# Patient Record
Sex: MALE | Race: BLACK OR AFRICAN AMERICAN | URBAN - METROPOLITAN AREA
[De-identification: names, ages, dates, MRNs, and addresses within clinical notes are randomized per-mention and may not be internally consistent; named-entity substitution may affect disease eponyms.]

---

## 2017-01-08 DIAGNOSIS — Z20.2 EXPOSURE TO CHLAMYDIA: ICD-10-CM

## 2017-01-24 ENCOUNTER — OFFICE VISIT (OUTPATIENT)
Dept: INTERNAL MEDICINE CLINIC | Age: 37
End: 2017-01-24

## 2017-01-24 VITALS
HEIGHT: 72 IN | DIASTOLIC BLOOD PRESSURE: 74 MMHG | SYSTOLIC BLOOD PRESSURE: 115 MMHG | OXYGEN SATURATION: 100 % | WEIGHT: 188.2 LBS | BODY MASS INDEX: 25.49 KG/M2 | TEMPERATURE: 98.2 F | RESPIRATION RATE: 16 BRPM | HEART RATE: 62 BPM

## 2017-01-24 DIAGNOSIS — A74.9 CHLAMYDIA: Primary | ICD-10-CM

## 2017-01-24 DIAGNOSIS — A74.9 CHLAMYDIA: ICD-10-CM

## 2017-01-24 NOTE — PROGRESS NOTES
Chief Complaint   Patient presents with    Chronic Kidney Disease     Stage 3 follow up   [facility]     done [facility] to discuss    Back Pain     is resolved there is no more back pain since last office visit      Patient was given another copy of the Advanced Directive form and understands to bring it in once completed. 1. Have you been to the ER, urgent care clinic since your last visit? Hospitalized since your last visit? No    2. Have you seen or consulted any other health care providers outside of the [address] [place] [address] since your last visit? Include any pap smears or colon screening.  No

## 2017-01-24 NOTE — MR AVS SNAPSHOT
Visit Information Date & Time Provider Department Dept. Phone Encounter #  
 1/24/2017  9:30 AM Soledad Matthews MD Internist of [facility] [address] [id number] Follow-up Instructions Return in about 1 year (around 1/24/2018), or if symptoms worsen or fail to improve. Upcoming Health Maintenance Date Due DTaP/Tdap/Td series (1 - Tdap) 3/2/1999 INFLUENZA AGE 9 TO ADULT 8/1/2016 Allergies as of 1/24/2017  Review Complete On: 1/24/2017 By: Soledad Matthews MD  
 No Known Allergies Current Immunizations  Never Reviewed No immunizations on file. Not reviewed this visit You Were Diagnosed With   
  
 Codes Comments Chlamydia    -  Primary ICD-10-CM: A74.9 ICD-9-CM: 079.98 Vitals BP Pulse Temp Resp Height(growth percentile) Weight(growth percentile) 115/74 (BP 1 Location: Left arm, BP Patient Position: Sitting) 62 98.2 °F (36.8 °C) (Oral) 16 6' (1.829 m) 188 lb 3.2 oz (85.4 kg) SpO2 BMI Smoking Status 100% 25.52 kg/m2 Former Smoker Vitals History BMI and BSA Data Body Mass Index Body Surface Area 25.52 kg/m 2 2.08 m 2 Preferred Pharmacy Pharmacy Name Phone [facility] [id number] [place], [address] AT [address] [phone] Your Updated Medication List  
  
   
This list is accurate as of: 1/24/17  9:58 AM.  Always use your most recent med list.  
  
  
  
  
 TYLENOL 325 mg tablet Generic drug:  acetaminophen Take 650 mg by mouth every four (4) hours as needed for Pain. Follow-up Instructions Return in about 1 year (around 1/24/2018), or if symptoms worsen or fail to improve. To-Do List   
 Around 01/24/2017 Lab:  CT/GC RRNA PLUS (TMA) URINE Patient Instructions Patient was given another copy of the Advanced Directive form and understands to bring it in once completed.  
Health Maintenance Due  
 Topic Date Due  
 DTaP/Tdap/Td series (1 - Tdap) 03/02/1999  
 INFLUENZA AGE 9 TO ADULT  08/01/2016 PLAN: 
Key points we discussed today: 1. Call our office if symptoms worsen or if you have any questions 2. Urine chlamydia testing ordered to ensure resolution of infection. Dr. Verena Uribe [facility] of [address] [place] [address], [address] [address] Phone: [phone] Fax: [phone] Thank you for choosing [facility] [employer] [facility] for all of your health care needs. Gonorrhea and Chlamydia: About These Tests What is it? You can have a test to find out if you have gonorrhea or chlamydia. This kind of test checks for the bacteria that cause these sexually transmitted infections (STIs). There are different ways to test for the bacteria. Most tests use either a urine sample or a sample of body fluid. A fluid sample can come from inside the tip of the penis or from inside the rectum or vagina. Why is this test done? These tests may be done to: · Find out if your symptoms are caused by gonorrhea or chlamydia. · Find out if you have one of these infections even though you don't have symptoms. How can you prepare for the test? 
· If you are a woman, do not douche or use vaginal creams or medicines for at least 24 hours before the test. 
· If you are going to have a urine test, do not urinate for 2 hours before the test. 
What happens during the test? 
· To get a sample from the urethra or rectum, the doctor will put a small swab into the tip of the penis or inside the rectum. · To get a sample from the vagina, the doctor will first put a speculum into the vagina. A speculum is a tool to spread apart the walls of the vagina. Then he or she will use a small swab to get the fluid sample. · For a urine sample, you will collect the urine that comes out when you first start to urinate. Don't wipe the genital area clean before you urinate. What else should you know about the test? 
· If you think you may have chlamydia or gonorrhea, don't have sexual intercourse until you get your test results. And you may want to have tests for other STIs, such as HIV. · If you do have an infection, don't have sexual intercourse for 7 days after you start treatment. · If you have an infection, your sex partner(s) should also be treated. How long does the test take? · The test will take a few minutes. What happens after the test? 
· You will be able to go home right away. · You can go back to your usual activities right away. Follow-up care is a key part of your treatment and safety. Be sure to make and go to all appointments, and call your doctor if you are having problems. It's also a good idea to keep a list of the medicines you take. Ask your doctor when you can expect to have your test results. Where can you learn more? Go to http://yovany-fabian.info/. Enter G948 in the search box to learn more about \"Gonorrhea and Chlamydia: About These Tests. \" Current as of: May 27, 2016 Content Version: 11.1 © 8823-7735 [employer]. Care instructions adapted under license by [employer] (which disclaims liability or warranty for this information). If you have questions about a medical condition or this instruction, always ask your healthcare professional. [address] any warranty or liability for your use of this information. Introducing [facility]! Chris Gavin introduces MVP Interactive patient portal. Now you can access parts of your medical record, email your doctor's office, and request medication refills online. 1. In your internet browser, go to [email or web address] acquired by [email or web address] 2. Click on the First Time User? Click Here link in the Sign In box. You will see the New Member Sign Up page. 3. Enter your MVP Interactive Access Code exactly as it appears below.  You will not need to use this code after youve completed the sign-up process. If you do not sign up before the expiration date, you must request a new code. · SparkupReader Access Code: [email or web address] Expires: 3/9/2017  9:23 AM 
 
4. Enter the last four digits of your Social Security Number (xxxx) and Date of Birth (mm/dd/yyyy) as indicated and click Submit. You will be taken to the next sign-up page. 5. Create a SparkupReader ID. This will be your SparkupReader login ID and cannot be changed, so think of one that is secure and easy to remember. 6. Create a SparkupReader password. You can change your password at any time. 7. Enter your Password Reset Question and Answer. This can be used at a later time if you forget your password. 8. Enter your e-mail address. You will receive e-mail notification when new information is available in [address] 9. Click Sign Up. You can now view and download portions of your medical record. 10. Click the Download Summary menu link to download a portable copy of your medical information. If you have questions, please visit the Frequently Asked Questions section of the SparkupReader website. Remember, SparkupReader is NOT to be used for urgent needs. For medical emergencies, dial 911. Now available from your iPhone and Android! Please provide this summary of care documentation to your next provider. Your primary care clinician is listed as Yamile Pitts. If you have any questions after today's visit, please call [phone].

## 2017-01-24 NOTE — PATIENT INSTRUCTIONS
Patient was given another copy of the Advanced Directive form and understands to bring it in once completed. Health Maintenance Due   Topic Date Due    DTaP/Tdap/Td series (1 - Tdap) 03/02/1999    INFLUENZA AGE 9 TO ADULT  08/01/2016       PLAN:  Key points we discussed today:  1. Call our office if symptoms worsen or if you have any questions    2. Urine chlamydia testing ordered to ensure resolution of infection. Dr. Rossy Loya  [facility]  Phone: [phone]  Fax: [phone]    Thank you for choosing [facility] for all of your health care needs. Gonorrhea and Chlamydia: About These Tests  What is it? You can have a test to find out if you have gonorrhea or chlamydia. This kind of test checks for the bacteria that cause these sexually transmitted infections (STIs). There are different ways to test for the bacteria. Most tests use either a urine sample or a sample of body fluid. A fluid sample can come from inside the tip of the penis or from inside the rectum or vagina. Why is this test done? These tests may be done to:  · Find out if your symptoms are caused by gonorrhea or chlamydia. · Find out if you have one of these infections even though you don't have symptoms. How can you prepare for the test?  · If you are a woman, do not douche or use vaginal creams or medicines for at least 24 hours before the test.  · If you are going to have a urine test, do not urinate for 2 hours before the test.  What happens during the test?  · To get a sample from the urethra or rectum, the doctor will put a small swab into the tip of the penis or inside the rectum. · To get a sample from the vagina, the doctor will first put a speculum into the vagina. A speculum is a tool to spread apart the walls of the vagina. Then he or she will use a small swab to get the fluid sample.   · For a urine sample, you will collect the urine that comes out when you first start to urinate. Don't wipe the genital area clean before you urinate. What else should you know about the test?  · If you think you may have chlamydia or gonorrhea, don't have sexual intercourse until you get your test results. And you may want to have tests for other STIs, such as HIV. · If you do have an infection, don't have sexual intercourse for 7 days after you start treatment. · If you have an infection, your sex partner(s) should also be treated. How long does the test take? · The test will take a few minutes. What happens after the test?  · You will be able to go home right away. · You can go back to your usual activities right away. Follow-up care is a key part of your treatment and safety. Be sure to make and go to all appointments, and call your doctor if you are having problems. It's also a good idea to keep a list of the medicines you take. Ask your doctor when you can expect to have your test results. Where can you learn more? Go to http://yovany-fabian.info/. Enter D681 in the search box to learn more about \"Gonorrhea and Chlamydia: About These Tests. \"  Current as of: May 27, 2016  Content Version: 11.1  © 9456-4746 "[employer].", Incorporated. Care instructions adapted under license by [employer] (which disclaims liability or warranty for this information). If you have questions about a medical condition or this instruction, always ask your healthcare professional. [place] [address] any warranty or liability for your use of this information.
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
## 2017-03-29 ENCOUNTER — HOSPITAL ENCOUNTER (OUTPATIENT)
Dept: LAB | Age: 37
Discharge: HOME | End: 2017-03-29
Payer: COMMERCIAL

## 2017-03-29 ENCOUNTER — OFFICE VISIT (OUTPATIENT)
Dept: INTERNAL MEDICINE CLINIC | Age: 37
End: 2017-03-29

## 2017-03-29 ENCOUNTER — TELEPHONE (OUTPATIENT)
Dept: INTERNAL MEDICINE CLINIC | Age: 37
End: 2017-03-29

## 2017-03-29 VITALS
BODY MASS INDEX: 25.22 KG/M2 | TEMPERATURE: 99.2 F | HEIGHT: 72 IN | SYSTOLIC BLOOD PRESSURE: 132 MMHG | DIASTOLIC BLOOD PRESSURE: 80 MMHG | WEIGHT: 186.2 LBS | OXYGEN SATURATION: 98 % | RESPIRATION RATE: 18 BRPM | HEART RATE: 77 BPM

## 2017-03-29 DIAGNOSIS — Z86.19 H/O CHLAMYDIA INFECTION: ICD-10-CM

## 2017-03-29 DIAGNOSIS — Z20.2 EXPOSURE TO STD: ICD-10-CM

## 2017-03-29 DIAGNOSIS — R39.15 URGENCY OF URINATION: Primary | ICD-10-CM

## 2017-03-29 DIAGNOSIS — R39.15 URGENCY OF URINATION: ICD-10-CM

## 2017-03-29 DIAGNOSIS — N18.30 CKD (CHRONIC KIDNEY DISEASE) STAGE 3, GFR 30-59 ML/MIN (HCC): ICD-10-CM

## 2017-03-29 DIAGNOSIS — R30.0 DYSURIA: ICD-10-CM

## 2017-03-29 LAB
ALBUMIN SERPL BCP-MCNC: 4.2 G/DL (ref 3.4–5)
ALBUMIN/GLOB SERPL: 1.2 {RATIO} (ref 0.8–1.7)
ALP SERPL-CCNC: 39 U/L (ref 45–117)
ALT SERPL-CCNC: 31 U/L (ref 16–61)
ANION GAP BLD CALC-SCNC: 6 MMOL/L (ref 3–18)
APPEARANCE UR: CLEAR
AST SERPL W P-5'-P-CCNC: 24 U/L (ref 15–37)
BILIRUB SERPL-MCNC: 0.4 MG/DL (ref 0.2–1)
BILIRUB UR QL STRIP: NEGATIVE
BILIRUB UR QL: NEGATIVE
BUN SERPL-MCNC: 15 MG/DL (ref 7–18)
BUN/CREAT SERPL: 14 (ref 12–20)
CALCIUM SERPL-MCNC: 9.5 MG/DL (ref 8.5–10.1)
CHLORIDE SERPL-SCNC: 102 MMOL/L (ref 100–108)
CO2 SERPL-SCNC: 31 MMOL/L (ref 21–32)
COLOR UR: YELLOW
CREAT SERPL-MCNC: 1.05 MG/DL (ref 0.6–1.3)
GLOBULIN SER CALC-MCNC: 3.4 G/DL (ref 2–4)
GLUCOSE SERPL-MCNC: 73 MG/DL (ref 74–99)
GLUCOSE UR STRIP.AUTO-MCNC: NEGATIVE MG/DL
GLUCOSE UR-MCNC: NEGATIVE MG/DL
HGB UR QL STRIP: NEGATIVE
KETONES P FAST UR STRIP-MCNC: NEGATIVE MG/DL
KETONES UR QL STRIP.AUTO: NEGATIVE MG/DL
LEUKOCYTE ESTERASE UR QL STRIP.AUTO: NEGATIVE
NITRITE UR QL STRIP.AUTO: NEGATIVE
PH UR STRIP: 6 [PH] (ref 4.6–8)
PH UR STRIP: 6 [PH] (ref 5–8)
POTASSIUM SERPL-SCNC: 3.9 MMOL/L (ref 3.5–5.5)
PROT SERPL-MCNC: 7.6 G/DL (ref 6.4–8.2)
PROT UR QL STRIP: NEGATIVE MG/DL
PROT UR STRIP-MCNC: NEGATIVE MG/DL
RPR SER QL: NONREACTIVE
SODIUM SERPL-SCNC: 139 MMOL/L (ref 136–145)
SP GR UR REFRACTOMETRY: 1.03 (ref 1–1.03)
SP GR UR STRIP: 1.02 (ref 1–1.03)
UA UROBILINOGEN AMB POC: NORMAL (ref 0.2–1)
URINALYSIS CLARITY POC: CLEAR
URINALYSIS COLOR POC: YELLOW
URINE BLOOD POC: NEGATIVE
URINE LEUKOCYTES POC: NEGATIVE
URINE NITRITES POC: NEGATIVE
UROBILINOGEN UR QL STRIP.AUTO: 0.2 EU/DL (ref 0.2–1)

## 2017-03-29 PROCEDURE — 87491 CHLMYD TRACH DNA AMP PROBE: CPT | Performed by: HOSPITALIST

## 2017-03-29 PROCEDURE — 80053 COMPREHEN METABOLIC PANEL: CPT | Performed by: HOSPITALIST

## 2017-03-29 PROCEDURE — 81003 URINALYSIS AUTO W/O SCOPE: CPT | Performed by: HOSPITALIST

## 2017-03-29 PROCEDURE — 86592 SYPHILIS TEST NON-TREP QUAL: CPT | Performed by: HOSPITALIST

## 2017-03-29 PROCEDURE — 87389 HIV-1 AG W/HIV-1&-2 AB AG IA: CPT | Performed by: HOSPITALIST

## 2017-03-29 PROCEDURE — 36415 COLL VENOUS BLD VENIPUNCTURE: CPT | Performed by: HOSPITALIST

## 2017-03-29 PROCEDURE — 86694 HERPES SIMPLEX NES ANTBDY: CPT | Performed by: HOSPITALIST

## 2017-03-29 RX ORDER — AZITHROMYCIN 1 G/1
1 POWDER, FOR SUSPENSION ORAL
Qty: 1 PACKET | Refills: 0 | Status: SHIPPED | OUTPATIENT
Start: 2017-03-29 | End: 2017-03-29

## 2017-03-29 NOTE — TELEPHONE ENCOUNTER
Per Danielle Base with [address] they do not have the following in the way it was prescribed:azithromycin (ZITHROMAX) 1 gram powder       They do have it 250 or 500 mg tab

## 2017-03-29 NOTE — MR AVS SNAPSHOT
Visit Information Date & Time Provider Department Dept. Phone Encounter #  
 3/29/2017 10:15  Skyler Str[address] DO Internists at [facility] [phone] Your Appointments 3/29/2017 11:00 AM  
Office Visit with Doug Beck MD  
Internist of [facility]) Appt Note: pt wants to consult [address] [facility], [address] [place] [address] [place]  
  
   
 [address], [address] Upcoming Health Maintenance Date Due DTaP/Tdap/Td series (1 - Tdap) 3/2/1999 INFLUENZA AGE 9 TO ADULT 8/1/2016 Allergies as of 3/29/2017  Review Complete On: 3/29/2017 By: Blue Arias LPN No Known Allergies Current Immunizations  Never Reviewed No immunizations on file. Not reviewed this visit You Were Diagnosed With   
  
 Codes Comments Urgency of urination    -  Primary ICD-10-CM: R39.15 ICD-9-CM: 831.52 Dysuria     ICD-10-CM: R30.0 ICD-9-CM: 788.1 Exposure to STD     ICD-10-CM: Z20.2 ICD-9-CM: V01.6 CKD (chronic kidney disease) stage 3, GFR 30-59 ml/min     ICD-10-CM: N18.3 ICD-9-CM: 585.3 H/O chlamydia infection     ICD-10-CM: Z86.19 ICD-9-CM: V12.09 Vitals BP Pulse Temp Resp Height(growth percentile) Weight(growth percentile) 132/80 (BP 1 Location: Left arm, BP Patient Position: Sitting) 77 99.2 °F (37.3 °C) (Oral) 18 6' (1.829 m) 186 lb 3.2 oz (84.5 kg) SpO2 BMI Smoking Status 98% 25.25 kg/m2 Former Smoker BMI and BSA Data Body Mass Index Body Surface Area  
 25.25 kg/m 2 2.07 m 2 Preferred Pharmacy Pharmacy Name Phone [phone] - [address], [address] AT [address] [phone] Your Updated Medication List  
  
   
This list is accurate as of: 3/29/17 10:26 AM.  Always use your most recent med list.  
  
  
  
  
 azithromycin 1 gram powder Commonly known as:  [place] Ping Take 1 Packet by mouth now for 1 dose. cefixime 400 mg tablet Commonly known as:  SUPRAX Take 1 Tab by mouth daily. TYLENOL 325 mg tablet Generic drug:  acetaminophen Take 650 mg by mouth every four (4) hours as needed for Pain. Prescriptions Sent to Pharmacy Refills  
 cefixime (SUPRAX) 400 mg tablet 0 Sig: Take 1 Tab by mouth daily. Class: Normal  
 Pharmacy: [facility] [address] [place] [address] AT [address] [place] [address] Rd [address] Ph #: [phone] Route: Oral  
 azithromycin (ZITHROMAX) 1 gram powder 0 Sig: Take 1 Packet by mouth now for 1 dose. Class: Normal  
 Pharmacy: [facility] [address] [place] [address] AT [address] [place] [address] [address] Ph #: [phone] Route: Oral  
  
We Performed the Following AMB POC URINALYSIS DIP STICK AUTO W/O MICRO [92758 CPT(R)] To-Do List   
 03/29/2017 Lab:  CHLAMYDIA/NEISSERIA/TRICHOMONAS AMP   
  
 03/29/2017 Microbiology:  CULTURE, HSV W/ TYPING   
  
 03/29/2017 Lab:  HIV 1/2 AG/AB, 4TH GENERATION,W RFLX CONFIRM   
  
 03/29/2017 Lab:  METABOLIC PANEL, COMPREHENSIVE   
  
 03/29/2017 Lab:  RPR   
  
 03/29/2017 Lab:  URINALYSIS W/ RFLX MICROSCOPIC Patient Instructions A Healthy Lifestyle: Care Instructions Your Care Instructions A healthy lifestyle can help you feel good, stay at a healthy weight, and have plenty of energy for both work and play. A healthy lifestyle is something you can share with your whole family. A healthy lifestyle also can lower your risk for serious health problems, such as high blood pressure, heart disease, and diabetes. You can follow a few steps listed below to improve your health and the health of your family. Follow-up care is a key part of your treatment and safety.  Be sure to make and go to all appointments, and call your doctor if you are having problems. Its also a good idea to know your test results and keep a list of the medicines you take. How can you care for yourself at home? · Do not eat too much sugar, fat, or fast foods. You can still have dessert and treats now and then. The goal is moderation. · Start small to improve your eating habits. Pay attention to portion sizes, drink less juice and soda pop, and eat more fruits and vegetables. ¨ Eat a healthy amount of food. A 3-ounce serving of meat, for example, is about the size of a deck of cards. Fill the rest of your plate with vegetables and whole grains. ¨ Limit the amount of soda and sports drinks you have every day. Drink more water when you are thirsty. ¨ Eat at least 5 servings of fruits and vegetables every day. It may seem like a lot, but it is not hard to reach this goal. A serving or helping is 1 piece of fruit, 1 cup of vegetables, or 2 cups of leafy, raw vegetables. Have an apple or some carrot sticks as an afternoon snack instead of a candy bar. Try to have fruits and/or vegetables at every meal. 
· Make exercise part of your daily routine. You may want to start with simple activities, such as walking, bicycling, or slow swimming. Try to be active 30 to 60 minutes every day. You do not need to do all 30 to 60 minutes all at once. For example, you can exercise 3 times a day for 10 or 20 minutes. Moderate exercise is safe for most people, but it is always a good idea to talk to your doctor before starting an exercise program. 
· Keep moving. Yulisa Hu the lawn, work in the garden, or GetAFive. Take the stairs instead of the elevator at work. · If you smoke, quit. People who smoke have an increased risk for heart attack, stroke, cancer, and other lung illnesses. Quitting is hard, but there are ways to boost your chance of quitting tobacco for good. ¨ Use nicotine gum, patches, or lozenges. ¨ Ask your doctor about stop-smoking programs and medicines. ¨ Keep trying. In addition to reducing your risk of diseases in the future, you will notice some benefits soon after you stop using tobacco. If you have shortness of breath or asthma symptoms, they will likely get better within a few weeks after you quit. · Limit how much alcohol you drink. Moderate amounts of alcohol (up to 2 drinks a day for men, 1 drink a day for women) are okay. But drinking too much can lead to liver problems, high blood pressure, and other health problems. Family health If you have a family, there are many things you can do together to improve your health. · Eat meals together as a family as often as possible. · Eat healthy foods. This includes fruits, vegetables, lean meats and dairy, and whole grains. · Include your family in your fitness plan. Most people think of activities such as jogging or tennis as the way to fitness, but there are many ways you and your family can be more active. Anything that makes you breathe hard and gets your heart pumping is exercise. Here are some tips: 
¨ Walk to do errands or to take your child to school or the bus. ¨ Go for a family bike ride after dinner instead of watching TV. Where can you learn more? Go to http://yovany-fabian.info/. Enter T656 in the search box to learn more about \"A Healthy Lifestyle: Care Instructions. \" Current as of: July 26, 2016 Content Version: 11.2 © 5479-2338 [employer]. Care instructions adapted under license by [employer] (which disclaims liability or warranty for this information). If you have questions about a medical condition or this instruction, always ask your healthcare professional. [place] [address] any warranty or liability for your use of this information. Safer Sex: Care Instructions Your Care Instructions Safer sex is a way to reduce your risk of getting an infection spread through sex. It can also help prevent pregnancy.  Most infections that are spread through sex, also called sexually transmitted infections or STIs, can be cured. But some can decrease your chances of getting pregnant if they are not treated early. Others, such as herpes, have no cure. And some, such as HIV, can be deadly. Several products can help you practice safer sex and reduce your chance of STIs. One of the best is a condom. There are condoms for men and for women. The female condom is a tube of soft plastic with a closed end that is placed deep into the vagina. You can use a special rubber sheet (dental dam) for protection during oral sex. Latex gloves can keep your hands from touching blood, semen, or other body fluids that can carry infections. Remember that birth control methods such as diaphragms, IUDs, foams, and birth control pills do not stop you from getting STIs. Follow-up care is a key part of your treatment and safety. Be sure to make and go to all appointments, and call your doctor if you are having problems. Its also a good idea to know your test results and keep a list of the medicines you take. How can you care for yourself at home? · Think about getting shots to prevent hepatitis A and hepatitis B. These two diseases can be spread through sex. You also can get hepatitis A if you eat infected food. · Use condoms or female condoms each time and every time you have sex. · Learn the right way to use a male condom: ¨ Condoms come in several sizes. Make sure you use the right size. A condom that is too small can break easily. A condom that is too big can slip off during sex. Use a new condom each time you have sex. ¨ Be careful not to poke a hole in the condom when you open the wrapper. ¨ Squeeze the tip of the condom to keep out air. ¨ Pull down the loose skin (foreskin) from the head of an uncircumcised penis. ¨ While squeezing the tip of the condom, unroll it all the way down to the base of the firm penis. ¨ Never use petroleum jelly (such as Vaseline), grease, hand lotion, baby oil, or anything with oil in it. These products can make holes in the condom. ¨ After sex, hold the condom on your penis as you remove your penis from your partner. This will keep semen from spilling out of the condom. · Learn to use a female condom: 
¨ You can put in a female condom up to 8 hours before sex. ¨ Squeeze the smaller ring at the closed end and insert it deep into the vagina. The larger ring at the open end should stay outside the vagina. ¨ During sex, make sure the penis goes into the condom. ¨ After the penis is removed, close the open end of the condom by twisting it. Remove the condom. · Do not use a female condom and male condom at the same time. · Do not have sex with anyone who has symptoms of an STI, such as sores on the genitals or mouth. The herpes virus that causes cold sores can spread to and from the penis and vagina. · Do not drink a lot of alcohol or use drugs before sex. This can cause you to let down your guard and not practice safer sex. · Having one sex partner (who does not have STIs and does not have sex with anyone else) is a sure way to avoid STIs. · Talk to your partner before you have sex. Find out if he or she has or is at risk for any STI. Keep in mind that a person may be able to spread an STI even if he or she does not have symptoms. You and your partner may want to get an HIV test. You should get tested again 6 months later. Where can you learn more? Go to http://yovany-fabian.info/. Enter C815 in the search box to learn more about \"Safer Sex: Care Instructions. \" Current as of: May 27, 2016 Content Version: 11.2 © 7312-4395 [employer]. Care instructions adapted under license by [employer] (which disclaims liability or warranty for this information).  If you have questions about a medical condition or this instruction, always ask your healthcare professional. [address] any warranty or liability for your use of this information. Chlamydia: Care Instructions Your Care Instructions Chlamydia is a bacterial infection spread through sexual contact. It is one of the most common sexually transmitted infections (STIs). Most people who get chlamydia do not have symptoms, but they can still infect their sex partners. If chlamydia in women is not treated, it can cause pelvic inflammatory disease (PID), a severe pelvic infection. PID can make it hard for a woman to get pregnant. Antibiotics can cure chlamydia. Both sex partners need treatment to keep from passing the infection back and forth. Certain antibiotics should not be used in pregnancy. If you were not tested for pregnancy during this visit, tell your doctor if you might be pregnant. Follow-up care is a key part of your treatment and safety. Be sure to make and go to all appointments, and call your doctor if you are having problems. Its also a good idea to know your test results and keep a list of the medicines you take. How can you care for yourself at home? · Chlamydia often is treated with a single dose of antibiotics in the doctor's office. If your doctor prescribed antibiotics to take at home, take them as directed. Do not stop taking them just because you feel better. You need to take the full course of antibiotics. · Do not have sex with anyone while you are being treated. If your treatment is a single dose of antibiotics, wait at least 7 days after you take the dose before you have sex. Even if you use a condom, you and your partner may pass the infection back and forth. · Make sure to tell your sex partner or partners that you have chlamydia. They should get treated, even if they do not have symptoms. · Your doctor may have done tests for other STIs. If so, call back in 3 or 4 days for those results. · Your doctor may advise you to be tested again for chlamydia in 3 or 4 months. How can you prevent chlamydia and other STIs in the future? · Use latex condoms every time you have sex. Use them from the beginning to the end of sexual contact. · Talk to your partner before you have sex. Find out if he or she has or is at risk for chlamydia or any other STI. Keep in mind that a person may be able to spread an STI even if he or she does not have symptoms. · Do not have sex while you are being treated for chlamydia or any other STI. · Do not have sex with anyone who has symptoms of an STI, such as sores on the genitals or mouth. · Having one sex partner (who does not have STIs and does not have sex with anyone else) is a good way to avoid STIs. When should you call for help? Call 911 anytime you think you may need emergency care. For example, call if: 
· You have sudden, severe pain in your belly or pelvis. Call your doctor now or seek immediate medical care if: 
· You have new belly or pelvic pain. · You have a fever. · You have new or increased burning or pain with urination, or you cannot urinate. · You have pain, swelling, or tenderness in the scrotum. Watch closely for changes in your health, and be sure to contact your doctor if: 
· You have unusual vaginal bleeding. · You have a discharge from the vagina or penis. · You think you may have been exposed to another STI. · Your symptoms get worse or have not improved within 1 week after starting treatment. · You have any new symptoms, such as sores, bumps, rashes, blisters, or warts in the genital or anal area. Where can you learn more? Go to http://yovany-fabian.info/. Enter L125 in the search box to learn more about \"Chlamydia: Care Instructions. \" Current as of: May 27, 2016 Content Version: 11.2 © 1918-2444 [employer].  Care instructions adapted under license by [address] (which disclaims liability or warranty for this information). If you have questions about a medical condition or this instruction, always ask your healthcare professional. [address] any warranty or liability for your use of this information. Introducing [facility]! Raqueljanet Carmona introduces [employer] patient portal. Now you can access parts of your medical record, email your doctor's office, and request medication refills online. 1. In your internet browser, go to [email or web address] 2. Click on the First Time User? Click Here link in the Sign In box. You will see the New Member Sign Up page. 3. Enter your [employer] Access Code exactly as it appears below. You will not need to use this code after youve completed the sign-up process. If you do not sign up before the expiration date, you must request a new code. · [employer] Access Code: [email or web address] Expires: 6/27/2017 10:26 AM 
 
4. Enter the last four digits of your Social Security Number (xxxx) and Date of Birth (mm/dd/yyyy) as indicated and click Submit. You will be taken to the next sign-up page. 5. Create a [employer] ID. This will be your [employer] login ID and cannot be changed, so think of one that is secure and easy to remember. 6. Create a [employer] password. You can change your password at any time. 7. Enter your Password Reset Question and Answer. This can be used at a later time if you forget your password. 8. Enter your e-mail address. You will receive e-mail notification when new information is available in [address] 9. Click Sign Up. You can now view and download portions of your medical record. 10. Click the Download Summary menu link to download a portable copy of your medical information. If you have questions, please visit the Frequently Asked Questions section of the [employer] website.  Remember, [employer] is NOT to be used for urgent needs. For medical emergencies, dial 911. Now available from your iPhone and Android! Please provide this summary of care documentation to your next provider. Your primary care clinician is listed as Guy Michelle. If you have any questions after today's visit, please call [phone].
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
## 2017-03-29 NOTE — PATIENT INSTRUCTIONS
A Healthy Lifestyle: Care Instructions  Your Care Instructions  A healthy lifestyle can help you feel good, stay at a healthy weight, and have plenty of energy for both work and play. A healthy lifestyle is something you can share with your whole family. A healthy lifestyle also can lower your risk for serious health problems, such as high blood pressure, heart disease, and diabetes. You can follow a few steps listed below to improve your health and the health of your family. Follow-up care is a key part of your treatment and safety. Be sure to make and go to all appointments, and call your doctor if you are having problems. Its also a good idea to know your test results and keep a list of the medicines you take. How can you care for yourself at home? · Do not eat too much sugar, fat, or fast foods. You can still have dessert and treats now and then. The goal is moderation. · Start small to improve your eating habits. Pay attention to portion sizes, drink less juice and soda pop, and eat more fruits and vegetables. ¨ Eat a healthy amount of food. A 3-ounce serving of meat, for example, is about the size of a deck of cards. Fill the rest of your plate with vegetables and whole grains. ¨ Limit the amount of soda and sports drinks you have every day. Drink more water when you are thirsty. ¨ Eat at least 5 servings of fruits and vegetables every day. It may seem like a lot, but it is not hard to reach this goal. A serving or helping is 1 piece of fruit, 1 cup of vegetables, or 2 cups of leafy, raw vegetables. Have an apple or some carrot sticks as an afternoon snack instead of a candy bar. Try to have fruits and/or vegetables at every meal.  · Make exercise part of your daily routine. You may want to start with simple activities, such as walking, bicycling, or slow swimming. Try to be active 30 to 60 minutes every day. You do not need to do all 30 to 60 minutes all at once.  For example, you can exercise 3 times a day for 10 or 20 minutes. Moderate exercise is safe for most people, but it is always a good idea to talk to your doctor before starting an exercise program.  · Keep moving. Mario Cluck the lawn, work in the garden, or [facility]. Take the stairs instead of the elevator at work. · If you smoke, quit. People who smoke have an increased risk for heart attack, stroke, cancer, and other lung illnesses. Quitting is hard, but there are ways to boost your chance of quitting tobacco for good. ¨ Use nicotine gum, patches, or lozenges. ¨ Ask your doctor about stop-smoking programs and medicines. ¨ Keep trying. In addition to reducing your risk of diseases in the future, you will notice some benefits soon after you stop using tobacco. If you have shortness of breath or asthma symptoms, they will likely get better within a few weeks after you quit. · Limit how much alcohol you drink. Moderate amounts of alcohol (up to 2 drinks a day for men, 1 drink a day for women) are okay. But drinking too much can lead to liver problems, high blood pressure, and other health problems. Family health  If you have a family, there are many things you can do together to improve your health. · Eat meals together as a family as often as possible. · Eat healthy foods. This includes fruits, vegetables, lean meats and dairy, and whole grains. · Include your family in your fitness plan. Most people think of activities such as jogging or tennis as the way to fitness, but there are many ways you and your family can be more active. Anything that makes you breathe hard and gets your heart pumping is exercise. Here are some tips:  ¨ Walk to do errands or to take your child to school or the bus. ¨ Go for a family bike ride after dinner instead of watching TV. Where can you learn more? Go to http://yovany-fabian.info/. Enter K067 in the search box to learn more about \"A Healthy Lifestyle: Care Instructions. \"  Current as of: July 26, 2016  Content Version: 11.2  © 3642-0765 [employer]. Care instructions adapted under license by [employer] (which disclaims liability or warranty for this information). If you have questions about a medical condition or this instruction, always ask your healthcare professional. [address] any warranty or liability for your use of this information. Safer Sex: Care Instructions  Your Care Instructions  Safer sex is a way to reduce your risk of getting an infection spread through sex. It can also help prevent pregnancy. Most infections that are spread through sex, also called sexually transmitted infections or STIs, can be cured. But some can decrease your chances of getting pregnant if they are not treated early. Others, such as herpes, have no cure. And some, such as HIV, can be deadly. Several products can help you practice safer sex and reduce your chance of STIs. One of the best is a condom. There are condoms for men and for women. The female condom is a tube of soft plastic with a closed end that is placed deep into the vagina. You can use a special rubber sheet (dental dam) for protection during oral sex. Latex gloves can keep your hands from touching blood, semen, or other body fluids that can carry infections. Remember that birth control methods such as diaphragms, IUDs, foams, and birth control pills do not stop you from getting STIs. Follow-up care is a key part of your treatment and safety. Be sure to make and go to all appointments, and call your doctor if you are having problems. Its also a good idea to know your test results and keep a list of the medicines you take. How can you care for yourself at home? · Think about getting shots to prevent hepatitis A and hepatitis B. These two diseases can be spread through sex. You also can get hepatitis A if you eat infected food.   · Use condoms or female condoms each time and every time you have sex. · Learn the right way to use a male condom:  ¨ Condoms come in several sizes. Make sure you use the right size. A condom that is too small can break easily. A condom that is too big can slip off during sex. Use a new condom each time you have sex. ¨ Be careful not to poke a hole in the condom when you open the wrapper. ¨ Squeeze the tip of the condom to keep out air. ¨ Pull down the loose skin (foreskin) from the head of an uncircumcised penis. ¨ While squeezing the tip of the condom, unroll it all the way down to the base of the firm penis. ¨ Never use petroleum jelly (such as Vaseline), grease, hand lotion, baby oil, or anything with oil in it. These products can make holes in the condom. ¨ After sex, hold the condom on your penis as you remove your penis from your partner. This will keep semen from spilling out of the condom. · Learn to use a female condom:  ¨ You can put in a female condom up to 8 hours before sex. ¨ Squeeze the smaller ring at the closed end and insert it deep into the vagina. The larger ring at the open end should stay outside the vagina. ¨ During sex, make sure the penis goes into the condom. ¨ After the penis is removed, close the open end of the condom by twisting it. Remove the condom. · Do not use a female condom and male condom at the same time. · Do not have sex with anyone who has symptoms of an STI, such as sores on the genitals or mouth. The herpes virus that causes cold sores can spread to and from the penis and vagina. · Do not drink a lot of alcohol or use drugs before sex. This can cause you to let down your guard and not practice safer sex. · Having one sex partner (who does not have STIs and does not have sex with anyone else) is a sure way to avoid STIs. · Talk to your partner before you have sex. Find out if he or she has or is at risk for any STI. Keep in mind that a person may be able to spread an STI even if he or she does not have symptoms.  You and your partner may want to get an HIV test. You should get tested again 6 months later. Where can you learn more? Go to http://yovany-fabian.info/. Enter N702 in the search box to learn more about \"Safer Sex: Care Instructions. \"  Current as of: May 27, 2016  Content Version: 11.2  © 3545-7122 [employer]. Care instructions adapted under license by [employer] (which disclaims liability or warranty for this information). If you have questions about a medical condition or this instruction, always ask your healthcare professional. [address] any warranty or liability for your use of this information. Chlamydia: Care Instructions  Your Care Instructions  Chlamydia is a bacterial infection spread through sexual contact. It is one of the most common sexually transmitted infections (STIs). Most people who get chlamydia do not have symptoms, but they can still infect their sex partners. If chlamydia in women is not treated, it can cause pelvic inflammatory disease (PID), a severe pelvic infection. PID can make it hard for a woman to get pregnant. Antibiotics can cure chlamydia. Both sex partners need treatment to keep from passing the infection back and forth. Certain antibiotics should not be used in pregnancy. If you were not tested for pregnancy during this visit, tell your doctor if you might be pregnant. Follow-up care is a key part of your treatment and safety. Be sure to make and go to all appointments, and call your doctor if you are having problems. Its also a good idea to know your test results and keep a list of the medicines you take. How can you care for yourself at home? · Chlamydia often is treated with a single dose of antibiotics in the doctor's office. If your doctor prescribed antibiotics to take at home, take them as directed. Do not stop taking them just because you feel better.  You need to take the full course of antibiotics. · Do not have sex with anyone while you are being treated. If your treatment is a single dose of antibiotics, wait at least 7 days after you take the dose before you have sex. Even if you use a condom, you and your partner may pass the infection back and forth. · Make sure to tell your sex partner or partners that you have chlamydia. They should get treated, even if they do not have symptoms. · Your doctor may have done tests for other STIs. If so, call back in 3 or 4 days for those results. · Your doctor may advise you to be tested again for chlamydia in 3 or 4 months. How can you prevent chlamydia and other STIs in the future? · Use latex condoms every time you have sex. Use them from the beginning to the end of sexual contact. · Talk to your partner before you have sex. Find out if he or she has or is at risk for chlamydia or any other STI. Keep in mind that a person may be able to spread an STI even if he or she does not have symptoms. · Do not have sex while you are being treated for chlamydia or any other STI. · Do not have sex with anyone who has symptoms of an STI, such as sores on the genitals or mouth. · Having one sex partner (who does not have STIs and does not have sex with anyone else) is a good way to avoid STIs. When should you call for help? Call 911 anytime you think you may need emergency care. For example, call if:  · You have sudden, severe pain in your belly or pelvis. Call your doctor now or seek immediate medical care if:  · You have new belly or pelvic pain. · You have a fever. · You have new or increased burning or pain with urination, or you cannot urinate. · You have pain, swelling, or tenderness in the scrotum. Watch closely for changes in your health, and be sure to contact your doctor if:  · You have unusual vaginal bleeding. · You have a discharge from the vagina or penis. · You think you may have been exposed to another STI.   · Your symptoms get worse or have not improved within 1 week after starting treatment. · You have any new symptoms, such as sores, bumps, rashes, blisters, or warts in the genital or anal area. Where can you learn more? Go to http://yovany-fabian.info/. Enter Z243 in the search box to learn more about \"Chlamydia: Care Instructions. \"  Current as of: May 27, 2016  Content Version: 11.2  © 8111-8921 [employer]. Care instructions adapted under license by [employer] (which disclaims liability or warranty for this information). If you have questions about a medical condition or this instruction, always ask your healthcare professional. [address] any warranty or liability for your use of this information.

## 2017-03-29 NOTE — TELEPHONE ENCOUNTER
Tiffany Womack called back and also doesn't have Suprax 400 tab    It is available in 400 mg capsule     Pt at store now waiting

## 2017-03-30 ENCOUNTER — OFFICE VISIT (OUTPATIENT)
Dept: INTERNAL MEDICINE CLINIC | Age: 37
End: 2017-03-30

## 2017-03-30 DIAGNOSIS — Z20.2 EXPOSURE TO STD: Primary | ICD-10-CM

## 2017-03-31 LAB
HIV 1+2 AB+HIV1 P24 AG SERPL QL IA: NON REACTIVE
HSV1+2 IGM SER IA-ACNC: <0.91 RATIO (ref 0–0.9)

## 2017-04-01 LAB
C TRACH RRNA SPEC QL NAA+PROBE: NEGATIVE
N GONORRHOEA RRNA SPEC QL NAA+PROBE: NEGATIVE
SPECIMEN SOURCE: NORMAL
T VAGINALIS RRNA SPEC QL NAA+PROBE: NEGATIVE